# Patient Record
Sex: MALE | Race: WHITE | Employment: FULL TIME | URBAN - METROPOLITAN AREA
[De-identification: names, ages, dates, MRNs, and addresses within clinical notes are randomized per-mention and may not be internally consistent; named-entity substitution may affect disease eponyms.]

---

## 2020-10-08 ENCOUNTER — HOSPITAL ENCOUNTER (EMERGENCY)
Age: 75
Discharge: HOME | End: 2020-10-09
Attending: EMERGENCY MEDICINE

## 2020-10-08 ENCOUNTER — APPOINTMENT (OUTPATIENT)
Dept: CT IMAGING | Age: 75
End: 2020-10-08

## 2020-10-08 ENCOUNTER — APPOINTMENT (OUTPATIENT)
Dept: GENERAL RADIOLOGY | Age: 75
End: 2020-10-08

## 2020-10-08 PROCEDURE — 70450 CT HEAD/BRAIN W/O DYE: CPT

## 2020-10-08 PROCEDURE — 72125 CT NECK SPINE W/O DYE: CPT

## 2020-10-08 PROCEDURE — 73080 X-RAY EXAM OF ELBOW: CPT

## 2020-10-08 PROCEDURE — 99284 EMERGENCY DEPT VISIT MOD MDM: CPT

## 2020-10-08 PROCEDURE — 12001 RPR S/N/AX/GEN/TRNK 2.5CM/<: CPT

## 2020-10-08 SDOH — HEALTH STABILITY: MENTAL HEALTH: HOW OFTEN DO YOU HAVE A DRINK CONTAINING ALCOHOL?: NEVER

## 2020-10-08 ASSESSMENT — PAIN SCALES - GENERAL: PAINLEVEL_OUTOF10: 7

## 2020-10-08 ASSESSMENT — PAIN DESCRIPTION - PAIN TYPE: TYPE: ACUTE PAIN

## 2020-10-08 ASSESSMENT — PAIN DESCRIPTION - LOCATION: LOCATION: HEAD

## 2020-10-08 ASSESSMENT — PAIN DESCRIPTION - FREQUENCY: FREQUENCY: CONTINUOUS

## 2020-10-08 ASSESSMENT — PAIN DESCRIPTION - DESCRIPTORS: DESCRIPTORS: ACHING

## 2020-10-08 NOTE — LETTER
[facility]  [address] [address]  Phone: [phone]  Fax: [phone]             October 9, 2020    Patient: Aissatou Caruso   YOB: 1945   Date of Visit: 10/8/2020       To Whom It May Concern:    Aissatou Caruso was seen and treated in our emergency department on 10/8/2020.  He may return to Work/School on the following date _10/10/2020.___________      Sincerely,       Gisella Bojorquez RN         Signature:__________________________________

## 2020-10-09 VITALS
TEMPERATURE: 98.1 F | HEART RATE: 97 BPM | BODY MASS INDEX: 29.8 KG/M2 | DIASTOLIC BLOOD PRESSURE: 87 MMHG | WEIGHT: 220 LBS | HEIGHT: 72 IN | SYSTOLIC BLOOD PRESSURE: 176 MMHG | RESPIRATION RATE: 18 BRPM | OXYGEN SATURATION: 99 %

## 2020-10-09 PROCEDURE — 6370000000 HC RX 637 (ALT 250 FOR IP): Performed by: EMERGENCY MEDICINE

## 2020-10-09 RX ORDER — ACETAMINOPHEN 500 MG
1000 TABLET ORAL ONCE
Status: COMPLETED | OUTPATIENT
Start: 2020-10-09 | End: 2020-10-09

## 2020-10-09 RX ADMIN — ACETAMINOPHEN 1000 MG: 500 TABLET, COATED ORAL at 00:30

## 2020-10-09 ASSESSMENT — PAIN SCALES - GENERAL: PAINLEVEL_OUTOF10: 3
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
## 2020-10-09 NOTE — ED PROVIDER NOTES
%.  Constitutional:  67 y.o. male who is awake and alert  HENT:  Mucous membranes moist, there is a 2 inch laceration on his scalp, V-shaped with a thin dusky strip of skin in the center, tenderness to his left cheek area, mild swelling of his lips, opens and closes his jaw normally though with some discomfort on the right side, no bony tenderness, extraocular muscle movements are normal, eyes are atraumatic, conjunctiva is clear, no hyphema, no hemotympanum, no septal hematoma  Neck:  Supple, no signs of injury, no ml tenderness  Thorax & Lungs:  Respiratory effort normal, clear  Abdomen:  Non distended, obese, NT  Back:  No deformity, no ML tenderness  Extremities:  No cyanosis, abrasion/skin tear to his left elbow, range of motion of the joint is normal, no bony tenderness  Neurologic:  Alert, speech normal, mentation normal, pupils equal, normal coordination of extremities, no facial asymmetry, slow but steady gait    DIAGNOSTIC RESULTS:    RADIOLOGY:    Plain x-rays were viewed by me:   XR ELBOW LEFT (MIN 3 VIEWS)   Final Result      Soft tissue injury along the antecubital fossa, with no acute osseous abnormality. CT HEAD WO CONTRAST   Final Result      Head:   Left frontal scalp hematoma and laceration with no underlying calvarial fracture, intracranial hemorrhage, or mass effect. Suspected mild chronic small vessel ischemic change. Cervical spine:   Multilevel degenerative changes as detailed, with no acute fracture or subluxation. CT CERVICAL SPINE WO CONTRAST   Final Result      Head:   Left frontal scalp hematoma and laceration with no underlying calvarial fracture, intracranial hemorrhage, or mass effect. Suspected mild chronic small vessel ischemic change. Cervical spine:   Multilevel degenerative changes as detailed, with no acute fracture or subluxation.               ED COURSE:    Medications administered:  Medications   acetaminophen (TYLENOL) tablet 1,000 mg
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
## 2020-10-09 NOTE — ED NOTES
Call placed to [employer], will get keys and bring to patient. Patient aware.      Bertram Pa RN  10/09/20 [id number]
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
